# Patient Record
Sex: FEMALE | Race: WHITE | NOT HISPANIC OR LATINO | Employment: UNEMPLOYED | URBAN - METROPOLITAN AREA
[De-identification: names, ages, dates, MRNs, and addresses within clinical notes are randomized per-mention and may not be internally consistent; named-entity substitution may affect disease eponyms.]

---

## 2018-09-27 ENCOUNTER — HISTORICAL (OUTPATIENT)
Dept: ADMINISTRATIVE | Facility: HOSPITAL | Age: 38
End: 2018-09-27

## 2018-09-27 LAB
ALBUMIN SERPL-MCNC: 4.6 GM/DL (ref 3.4–5)
ALBUMIN/GLOB SERPL: 1.44 {RATIO} (ref 1.5–2.5)
ALP SERPL-CCNC: 58 UNIT/L (ref 38–126)
ALT SERPL-CCNC: 19 UNIT/L (ref 7–52)
AST SERPL-CCNC: 20 UNIT/L (ref 15–37)
BILIRUB SERPL-MCNC: 0.9 MG/DL (ref 0.2–1)
BILIRUBIN DIRECT+TOT PNL SERPL-MCNC: 0.2 MG/DL (ref 0–0.5)
BILIRUBIN DIRECT+TOT PNL SERPL-MCNC: 0.7 MG/DL
BUN SERPL-MCNC: 23 MG/DL (ref 7–18)
CALCIUM SERPL-MCNC: 9.6 MG/DL (ref 8.5–10)
CHLORIDE SERPL-SCNC: 102 MMOL/L (ref 98–107)
CO2 SERPL-SCNC: 29 MMOL/L (ref 21–32)
CREAT SERPL-MCNC: 0.74 MG/DL (ref 0.6–1.3)
GLOBULIN SER-MCNC: 3.2 GM/DL (ref 1.2–3)
GLUCOSE SERPL-MCNC: 96 MG/DL (ref 74–106)
POTASSIUM SERPL-SCNC: 3.9 MMOL/L (ref 3.5–5.1)
PROT SERPL-MCNC: 7.8 GM/DL (ref 6.4–8.2)
SODIUM SERPL-SCNC: 135 MMOL/L (ref 136–145)

## 2020-04-27 ENCOUNTER — HISTORICAL (OUTPATIENT)
Dept: ADMINISTRATIVE | Facility: HOSPITAL | Age: 40
End: 2020-04-27

## 2022-04-10 ENCOUNTER — HISTORICAL (OUTPATIENT)
Dept: ADMINISTRATIVE | Facility: HOSPITAL | Age: 42
End: 2022-04-10

## 2022-04-29 VITALS
DIASTOLIC BLOOD PRESSURE: 83 MMHG | BODY MASS INDEX: 35.16 KG/M2 | BODY MASS INDEX: 30.93 KG/M2 | HEIGHT: 65 IN | SYSTOLIC BLOOD PRESSURE: 114 MMHG | HEIGHT: 63 IN | OXYGEN SATURATION: 99 % | DIASTOLIC BLOOD PRESSURE: 96 MMHG | WEIGHT: 198.44 LBS | WEIGHT: 185.63 LBS | SYSTOLIC BLOOD PRESSURE: 138 MMHG | OXYGEN SATURATION: 97 %

## 2022-05-02 NOTE — HISTORICAL [facility]
This is a historical note converted from Des. Formatting and pictures may have been removed.  Please reference Des for original formatting and attached multimedia. Chief Complaint  DISCUSS GETTING BACK ON PAXIL FOR ANXIETY  History of Present Illness  Patient last seen 2016.? She previously was taking Paxil for generalized anxiety.? She ran out of her prescription 2 months ago.? Reports anxiety has significantly increased since that time.? Easily overwhelmed and increased irritability.  LFTs also noted to be elevated last year.? Patient was switched from Zoloft to Paxil due to this reason.? Patient also started?GI for further evaluation. ?Patient did not?proceed with this referral?has not had a follow-up since then. ?No obvious?etiology was found at that time.  One-month history of intermittent left eye redness, watery drainage?and eyelid edema.? Denies any blurred vision, photophobia?or purulent drainage.  Review of Systems  Constitutional:?No weight loss, no fever, no fatigue, no chills, no night sweats,?no weakness  Eyes:?No blurred vision,?redness,?no drainage,?no ocular?pain  HEENT:?No sore throat,?no ear pain, no sinus pressure, no nasal congestion, no rhinorrhea, no postnasal drip  Respiratory:?No cough, no wheezing, no sputum production, no shortness of breath  Cardiovascular:?No chest pain, no palpitations, no dyspnea on exertion,?no orthopnea  Gastrointestinal:?No nausea, no vomiting, no abdominal pain, no diarrhea,?no constipation, no melena,?no hematochezia  Genitourinary:?No dysuria, no hematuria, no frequency, no urgency, no incontinence, no discharge  Musculoskeletal:?No myalgias, no arthralgias, no weakness, no joint effusion, no edema  Integumentary:?No rashes, no hives, no itching, no lesions, no jaundice  Neurologic:?No headaches, no numbness, no tingling, no weakness, no dizziness  Psychiatric:?anxiety, irritability, no depression,?no suicidal ideations, no?homicidal ideations,?no  delusions, no hallucinations  Endocrine:?No polyuria, no polydipsia, no polyphagia  Hematology:?No bruising, no lymphadenopathy,?no paleness  ?  Physical Exam  Vitals & Measurements  HR:?80(Peripheral)? BP:?120/90?  HT:?165.1?cm? HT:?165.1?cm? WT:?79.7?kg? WT:?79.7?kg? BMI:?29.24?  General:?Well developed, Well-nourished, in No acute distress, A&O x 4  Eye:?PERRLA, EOMI, left-sided conjunctiva?erythema, left eyelid edematous  Ears:?Bilateral EAC clear?and?Bilateral TM clear  Nose:? Mucosa normal, No rhinorrhea, No lesions  O/P:??No?erythema,?No tonsillar hypertrophy,?No tonsillar exudates,?No cobblestoning  Neck:?Soft, Nontender, No thyromegaly, Full range of motion, No cervical lymphadenopathy, No JVD  Cardiovascular:?Regular rate and rhythm, No murmurs, No gallops, No rubs  Respiratory:?Clear to auscultation bilaterally, No wheezes, No rhonchi, No?crackles  Abdomen:? Soft, Nontender, No hepatosplenomegaly, Normal and equal bowel sounds, No masses, No rebound, No guarding  Musculoskeletal:? No tenderness, FROM, No joint abnormality, No clubbing, No cyanosis,No?edema  Neurologic:? No motor or sensory deficits, Reflexes 2+ throughout, CN II-XII grossly intact  Integumentary:?No rashes or skin lesions  ?   Psychiatric:?Good insight,?appropriate thought process, normal affect,?appropriate?speech,  non-suicidal, cooperative, appropriate judgment  Assessment/Plan  1.?MONTSE - Generalized anxiety disorder  ?Discuss treatment options at length?along with coping skills and stress management  Start citalopram 20 mg daily  Ordered:  Clinic Follow up, *Est. 12/27/18 3:00:00 CST, Order for future visit, MONTSE - Generalized anxiety disorder  Need for vaccination  Transaminitis  Redness of left eye, [facility]  Office/Outpatient Visit Level 4 Established [id number] PC, MONTSE - Generalized anxiety disorder  Need for vaccination  Transaminitis  Redness of left eye, [facility] - [facility], 09/27/18 10:58:00 CDT  ?  2.?Need for  vaccination  ?Influenza vaccine IM today  Ordered:  Clinic Follow up, *Est. 12/27/18 3:00:00 CST, Order for future visit, MONTSE - Generalized anxiety disorder  Need for vaccination  Transaminitis  Redness of left eye, [facility]  Office/Outpatient Visit Level 4 Established [id number] PC, MONTSE - Generalized anxiety disorder  Need for vaccination  Transaminitis  Redness of left eye, [facility] - [facility], 09/27/18 10:58:00 CDT  ?  3.?Transaminitis  ?CMP today  Ordered:  Clinic Follow up, *Est. 12/27/18 3:00:00 CST, Order for future visit, MONTSE - Generalized anxiety disorder  Need for vaccination  Transaminitis  Redness of left eye, [facility]  Office/Outpatient Visit Level 4 Established [id number] PC, MONTSE - Generalized anxiety disorder  Need for vaccination  Transaminitis  Redness of left eye, [facility] - [facility], 09/27/18 10:58:00 CDT  ?  4.?Redness of left eye  ?Recommend ophthalmology follow-up  Ordered:  Clinic Follow up, *Est. 12/27/18 3:00:00 CST, Order for future visit, MONTSE - Generalized anxiety disorder  Need for vaccination  Transaminitis  Redness of left eye, [facility]  Office/Outpatient Visit Level 4 Established [id number] PC, MONTSE - Generalized anxiety disorder  Need for vaccination  Transaminitis  Redness of left eye, [facility] - [facility], 09/27/18 10:58:00 CDT  ?  Orders:  citalopram, 20 mg = 1 tab(s), Oral, Daily, # 30 tab(s), 5 Refill(s), Pharmacy: [facility]/pharmacy #[id number]   Problem List/Past Medical History  Ongoing  MONTSE - Generalized anxiety disorder  Historical  Pregnant  Pregnant  Pregnant  Procedure/Surgical History  Delivery of Products of Conception, External Approach (07/26/2016)  Introduction of Other Hormone into Peripheral Vein, Percutaneous Approach (07/26/2016)  Appendectomy  wisdom teeth extraction   Medications  Celexa 20 mg oral tablet, 20 mg= 1 tab(s), Oral, Daily, 5 refills  Allergies  Penicillin G Potassium  Social History  Alcohol  Never, 07/25/2016  Substance Abuse  Never, 07/25/2016  Tobacco  Never  smoker, 07/25/2016  Family History  Depression.: Mother and Father.  Hyperglycemia.: Father.  Hypertension.: Mother and Father.  Hypothyroidism.: Mother.  Immunizations  Vaccine Date Status   influenza virus vaccine, inactivated 09/27/2018 Given   tetanus/diphtheria/pertussis, acel(Tdap) 07/26/2016 Given   Health Maintenance  Health Maintenance  ???Pending?(in the next year)  ??? ??Due?  ??? ? ? ?ADL Screening due??09/27/18??and every 1??year(s)  ??? ? ? ?Alcohol Misuse Screening due??09/27/18??and every 1??year(s)  ???Satisfied?(in the past 1 year)  ??? ??Satisfied?  ??? ? ? ?Blood Pressure Screening on??09/27/18.??Satisfied by Carolyn Lim  ??? ? ? ?Body Mass Index Check on??09/27/18.??Satisfied by Carolyn Lim  ??? ? ? ?Influenza Vaccine on??09/27/18.??Satisfied by Eleno Houston Jr, MD  ??? ? ? ?Obesity Screening on??09/27/18.??Satisfied by Carolyn Lim  ?  ?

## 2022-05-02 NOTE — HISTORICAL [facility]
This is a historical note converted from [place]. Formatting and pictures may have been removed.  Please reference [place] for original formatting and attached multimedia. Chief Complaint  left arm pain (bicep) unable to  arm over head, radiates down arm sometimes x 3 days  History of Present Illness  39-year-old female presents to clinic complaining of?left arm pain.? Patient states she woke up with the pain Saturday morning, 3 days ago. ?Denies any trauma injury or fall.? States that there is a dull ache constantly but?when trying to lift the arm or move the arm?forward?or to the side?or above?90 degrees to, for example,?brush her hair?the pain worsens and it will be a sharp pain. ?About 6 out of 10 in severity.? Patient denies any chest pain shortness of breath nausea?diaphoresis?abdominal pain.? Patient states the pain will rarely?radiate down the arm.? Patient denies carrying any heavy bags on that arm  Review of Systems  Constitutional: negative except as stated in HPI  Eye: negative except as stated in HPI  ENT: negative except as stated in HPI  Respiratory: negative except as stated in HPI  Cardiovascular: negative except as stated in HPI  Gastrointestinal: negative except as stated in HPI  Genitourinary: negative except as stated in HPI  Hema/Lymph: negative except as stated in HPI  Endocrine: negative except as stated in HPI  Immunologic: negative except as stated in HPI  Musculoskeletal: negative except as stated in HPI  Integumentary: negative except as stated in HPI  Neurologic: negative except as stated in HPI  All Other ROS_ negative except as stated in HPI  Physical Exam  Vitals & Measurements  T:?37.0? ?C (Oral)? HR:?89(Peripheral)? RR:?20? BP:?114/83? SpO2:?97%?  HT:?165?cm? WT:?84.2?kg? BMI:?30.93?  General_well-developed well-nourished in no acute distress  Musculoskeletal_tenderness to palpation left upper arm medial aspect near the axilla?there is no overlying erythema or warmth?of the  skin.? Patient has limited range of motion due to pain with extension and abduction.??There is no swelling of the arm  Integumentary_no rashes or skin lesions present  Neurologic_ cranial nerves intact, no signs of peripheral neurological deficit, motor/sensory function intact  ?  Assessment/Plan  1.?Left upper arm pain?M79.622  ?Start the anti-inflammatory today.? Do not take any Advil Aleve Motrin or ibuprofen with it.? Take the medication with food.? Pain medication may make you drowsy do not drink any alcohol operate heavy machinery?or?drive if you take it.? Apply ice to the affected area 3-4 times a day for 10 to 15 minutes.? call your family doctor tomorrow?to discuss.? If your pain continues after 1 week?I would recommend further evaluation with an orthopedic doctor. ?We can refer you to one if needed just call us.? If you have?any swelling of the arm?or worsening of symptoms?seek medical attention immediately  Ordered:  diclofenac, 75 mg = 1 tab(s), Oral, BID, # 14 tab(s), 0 Refill(s), Pharmacy: [facility] #[id number], 165, cm, Height/Length Dosing, 04/27/20 18:03:00 CDT, 84.2, kg, Weight Dosing, 04/27/20 18:03:00 CDT  traMADol, 50 mg = 1 tab(s), Oral, q6hr, PRN PRN as needed for pain, X 3 day(s), # 12 tab(s), 0 Refill(s), Pharmacy: [facility] #[id number], 165, cm, Height/Length Dosing, 04/27/20 18:03:00 CDT, 84.2, kg, Weight Dosing, 04/27/20 18:03:00 CDT  Electrocardiogram, Complete [id number] PC, 04/27/20 19:01:00 CDT, [facility], Routine, 04/27/20 19:01:00 CDT, Left upper arm pain  ?   Problem List/Past Medical History  Ongoing  MONTSE - Generalized anxiety disorder  Obesity  Historical  Pregnant  Pregnant  Pregnant  Procedure/Surgical History  Delivery of Products of Conception, External Approach (07/26/2016)  Introduction of Other Hormone into Peripheral Vein, Percutaneous Approach (07/26/2016)  Appendectomy  wisdom teeth extraction   Medications  diclofenac sodium 75 mg oral delayed release tablet, 75 mg= 1 tab(s),  Oral, BID  escitalopram 10 mg oral tablet, 10 mg= 1 tab(s), Oral, Daily  escitalopram 20 mg oral tablet, See Instructions  traMADol 50 mg oral tablet, 50 mg= 1 tab(s), Oral, q6hr, PRN  Allergies  Penicillin G Potassium  Social History  Abuse/Neglect  No, 04/27/2020  Alcohol  Never, 07/25/2016  Substance Use  Never, 07/25/2016  Tobacco  Never (less than 100 in lifetime), N/A, 01/24/2019  Family History  Depression.: Mother and Father.  Hyperglycemia.: Father.  Hypertension.: Mother and Father.  Hypothyroidism.: Mother.  Immunizations  Vaccine Date Status   influenza virus vaccine, inactivated 09/27/2018 Given   tetanus/diphtheria/pertussis, acel(Tdap) 07/26/2016 Given   Health Maintenance  Health Maintenance  ???Pending?(in the next year)  ??? ??OverDue  ??? ? ? ?Cervical Cancer Screening due??12/06/19??and every 3??year(s)  ??? ? ? ?Alcohol Misuse Screening due??01/01/20??and every 1??year(s)  ??? ??Due?  ??? ? ? ?ADL Screening due??04/27/20??and every 1??year(s)  ??? ? ? ?Depression Screening due??04/27/20??and every?  ??? ??Due In Future?  ??? ? ? ?Obesity Screening not due until??01/01/21??and every 1??year(s)  ???Satisfied?(in the past 1 year)  ??? ??Satisfied?  ??? ? ? ?Blood Pressure Screening on??04/27/20.??Satisfied by Erich Romo  ??? ? ? ?Body Mass Index Check on??04/27/20.??Satisfied by Erich Romo  ??? ? ? ?Obesity Screening on??04/27/20.??Satisfied by Erich Romo  ?  Diagnostic Results  Normal sinus rhythm no acute ST changes

## 2022-06-02 ENCOUNTER — OFFICE VISIT (OUTPATIENT)
Dept: URGENT CARE | Facility: CLINIC | Age: 42
End: 2022-06-02
Payer: COMMERCIAL

## 2022-06-02 VITALS
SYSTOLIC BLOOD PRESSURE: 123 MMHG | WEIGHT: 210 LBS | HEART RATE: 87 BPM | OXYGEN SATURATION: 100 % | DIASTOLIC BLOOD PRESSURE: 90 MMHG | RESPIRATION RATE: 16 BRPM | TEMPERATURE: 98 F | HEIGHT: 66 IN | BODY MASS INDEX: 33.75 KG/M2

## 2022-06-02 DIAGNOSIS — J06.9 ACUTE URI: Primary | ICD-10-CM

## 2022-06-02 PROCEDURE — 99213 PR OFFICE/OUTPT VISIT, EST, LEVL III, 20-29 MIN: ICD-10-PCS | Mod: 25,,, | Performed by: PHYSICIAN ASSISTANT

## 2022-06-02 PROCEDURE — 1159F PR MEDICATION LIST DOCUMENTED IN MEDICAL RECORD: ICD-10-PCS | Mod: CPTII,,, | Performed by: PHYSICIAN ASSISTANT

## 2022-06-02 PROCEDURE — 3008F PR BODY MASS INDEX (BMI) DOCUMENTED: ICD-10-PCS | Mod: CPTII,,, | Performed by: PHYSICIAN ASSISTANT

## 2022-06-02 PROCEDURE — 1160F PR REVIEW ALL MEDS BY PRESCRIBER/CLIN PHARMACIST DOCUMENTED: ICD-10-PCS | Mod: CPTII,,, | Performed by: PHYSICIAN ASSISTANT

## 2022-06-02 PROCEDURE — 96372 THER/PROPH/DIAG INJ SC/IM: CPT | Mod: ,,, | Performed by: PHYSICIAN ASSISTANT

## 2022-06-02 PROCEDURE — 99213 OFFICE O/P EST LOW 20 MIN: CPT | Mod: 25,,, | Performed by: PHYSICIAN ASSISTANT

## 2022-06-02 PROCEDURE — 1159F MED LIST DOCD IN RCRD: CPT | Mod: CPTII,,, | Performed by: PHYSICIAN ASSISTANT

## 2022-06-02 PROCEDURE — 96372 PR INJECTION,THERAP/PROPH/DIAG2ST, IM OR SUBCUT: ICD-10-PCS | Mod: ,,, | Performed by: PHYSICIAN ASSISTANT

## 2022-06-02 PROCEDURE — 3080F PR MOST RECENT DIASTOLIC BLOOD PRESSURE >= 90 MM HG: ICD-10-PCS | Mod: CPTII,,, | Performed by: PHYSICIAN ASSISTANT

## 2022-06-02 PROCEDURE — 3074F SYST BP LT 130 MM HG: CPT | Mod: CPTII,,, | Performed by: PHYSICIAN ASSISTANT

## 2022-06-02 PROCEDURE — 1160F RVW MEDS BY RX/DR IN RCRD: CPT | Mod: CPTII,,, | Performed by: PHYSICIAN ASSISTANT

## 2022-06-02 PROCEDURE — 3080F DIAST BP >= 90 MM HG: CPT | Mod: CPTII,,, | Performed by: PHYSICIAN ASSISTANT

## 2022-06-02 PROCEDURE — 3008F BODY MASS INDEX DOCD: CPT | Mod: CPTII,,, | Performed by: PHYSICIAN ASSISTANT

## 2022-06-02 PROCEDURE — 3074F PR MOST RECENT SYSTOLIC BLOOD PRESSURE < 130 MM HG: ICD-10-PCS | Mod: CPTII,,, | Performed by: PHYSICIAN ASSISTANT

## 2022-06-02 RX ORDER — AZITHROMYCIN 250 MG/1
TABLET, FILM COATED ORAL
Qty: 6 TABLET | Refills: 0 | Status: SHIPPED | OUTPATIENT
Start: 2022-06-02 | End: 2022-08-30

## 2022-06-02 RX ORDER — BUPROPION HCL 300 MG
300 TABLET, EXTENDED RELEASE 24 HR ORAL EVERY MORNING
COMMUNITY
Start: 2022-05-17 | End: 2023-06-19 | Stop reason: SDUPTHER

## 2022-06-02 RX ORDER — ESCITALOPRAM OXALATE 10 MG/1
TABLET, FILM COATED ORAL
COMMUNITY
Start: 2022-05-17 | End: 2022-10-12

## 2022-06-02 RX ORDER — BETAMETHASONE SODIUM PHOSPHATE AND BETAMETHASONE ACETATE 3; 3 MG/ML; MG/ML
9 INJECTION, SUSPENSION INTRA-ARTICULAR; INTRALESIONAL; INTRAMUSCULAR; SOFT TISSUE
Status: COMPLETED | OUTPATIENT
Start: 2022-06-02 | End: 2022-06-02

## 2022-06-02 RX ORDER — PROMETHAZINE HYDROCHLORIDE AND DEXTROMETHORPHAN HYDROBROMIDE 6.25; 15 MG/5ML; MG/5ML
5 SYRUP ORAL EVERY 6 HOURS PRN
Qty: 118 ML | Refills: 0 | Status: SHIPPED | OUTPATIENT
Start: 2022-06-02 | End: 2022-06-12

## 2022-06-02 RX ADMIN — BETAMETHASONE SODIUM PHOSPHATE AND BETAMETHASONE ACETATE 9 MG: 3; 3 INJECTION, SUSPENSION INTRA-ARTICULAR; INTRALESIONAL; INTRAMUSCULAR; SOFT TISSUE at 07:06

## 2022-06-02 NOTE — PROGRESS NOTES
"Subjective:       Patient ID: Kerline Londono is a 41 y.o. female.    Vitals:  height is 5' 6" (1.676 m) and weight is 95.3 kg (210 lb). Her temperature is 98 °F (36.7 °C). Her blood pressure is 123/90 (abnormal) and her pulse is 87. Her respiration is 16 and oxygen saturation is 100%.     Chief Complaint: Sinus Problem    Patient is a 41-year-old female complains of 4-5 day history of nasal congestion sinus pressure postnasal drip hoarseness and a cough.  Denies any fever neck stiffness rash shortness of breath GI symptoms.    Sinus Problem  This is a new problem. The current episode started today. The problem is unchanged. There has been no fever. Her pain is at a severity of 6/10. Associated symptoms include congestion, coughing, ear pain, a hoarse voice and a sore throat. Past treatments include oral decongestants.       HENT: Positive for ear pain, congestion and sore throat.    Respiratory: Positive for cough.        Objective:      Physical Exam   Constitutional: She is oriented to person, place, and time. She appears well-developed. She is cooperative.  Non-toxic appearance. She does not appear ill. No distress.   HENT:   Head: Normocephalic and atraumatic.   Ears:   Right Ear: Hearing, tympanic membrane, external ear and ear canal normal.   Left Ear: Hearing, tympanic membrane, external ear and ear canal normal.   Nose: Nose normal. No mucosal edema, rhinorrhea or nasal deformity. No epistaxis. Right sinus exhibits no maxillary sinus tenderness and no frontal sinus tenderness. Left sinus exhibits no maxillary sinus tenderness and no frontal sinus tenderness.   Mouth/Throat: Uvula is midline, oropharynx is clear and moist and mucous membranes are normal. No trismus in the jaw. Normal dentition. No uvula swelling. No oropharyngeal exudate, posterior oropharyngeal edema or posterior oropharyngeal erythema.   Eyes: Conjunctivae and lids are normal. No scleral icterus.   Neck: Trachea normal and phonation normal. " "Neck supple. No edema present. No erythema present. No neck rigidity present.   Cardiovascular: Normal rate, regular rhythm, normal heart sounds and normal pulses.   Pulmonary/Chest: Effort normal and breath sounds normal. No respiratory distress. She has no decreased breath sounds. She has no rhonchi.   Abdominal: Normal appearance.   Musculoskeletal: Normal range of motion.         General: No deformity. Normal range of motion.   Neurological: She is alert and oriented to person, place, and time. She exhibits normal muscle tone. Coordination normal.   Skin: Skin is warm, dry, intact, not diaphoretic and not pale.   Psychiatric: Her speech is normal and behavior is normal. Judgment and thought content normal.   Nursing note and vitals reviewed.  pt declines covid testing         Previous History      Review of patient's allergies indicates:   Allergen Reactions    Penicillins        Past Medical History:   Diagnosis Date    Anxiety      Current Outpatient Medications   Medication Instructions    azithromycin (ZITHROMAX Z-MILE) 250 MG tablet Take 2 tablets (500 mg) on  Day 1,  followed by 1 tablet (250 mg) once daily on Days 2 through 5.    LEXAPRO 10 mg tablet Oral    promethazine-dextromethorphan (PROMETHAZINE-DM) 6.25-15 mg/5 mL Syrp 5 mLs, Oral, Every 6 hours PRN    WELLBUTRIN  mg, Oral, Every morning     Past Surgical History:   Procedure Laterality Date    APPENDECTOMY       Family History   Problem Relation Age of Onset    No Known Problems Mother     No Known Problems Father        Social History     Tobacco Use    Smoking status: Never Smoker    Smokeless tobacco: Never Used   Substance Use Topics    Alcohol use: Not Currently    Drug use: Never        Physical Exam      Vital Signs Reviewed   BP (!) 123/90   Pulse 87   Temp 98 °F (36.7 °C)   Resp 16   Ht 5' 6" (1.676 m)   Wt 95.3 kg (210 lb)   LMP 05/26/2022   SpO2 100%   BMI 33.89 kg/m²        Procedures    Procedures     Labs "     Results for orders placed or performed in visit on 09/27/18   Comprehensive Metabolic Panel   Result Value Ref Range    Alanine Aminotransferase 19.0 7.0 - 52.0 unit/L    Albumin/Globulin Ratio 1.44 (L) 1.50 - 2.50    Alkaline Phosphatase 58.0 38.0 - 126.0 unit/L    Albumin Level 4.6 3.4 - 5.0 gm/dL    Aspartate Aminotransferase 20.0 15.0 - 37.0 unit/L    Bilirubin Total 0.9 0.2 - 1.0 mg/dL    Bilirubin Indirect 0.7 mg/dL    Blood Urea Nitrogen 23.0 (H) 7.0 - 18.0 mg/dL    Bilirubin Direct 0.2 0.0 - 0.5 mg/dL    Chloride 102.0 98.0 - 107.0 mmol/L    Carbon Dioxide 29.0 21.0 - 32.0 mmol/L    Calcium Level Total 9.6 8.5 - 10.0 mg/dL    Creatinine 0.74 0.60 - 1.30 mg/dL    Globulin 3.2 (H) 1.2 - 3.0 gm/dL    Glucose Level 96.0 74.0 - 106.0 mg/dL    Sodium Level 135.0 (L) 136.0 - 145.0 mmol/L    Potassium Level 3.9 3.5 - 5.1 mmol/L    Protein Total 7.8 6.4 - 8.2 gm/dL   GFR, Estimated   Result Value Ref Range    Estimated GFR-Non African American >60 mL/min/1.73 m2    Estimated GFR-African American >60 mL/min/1.73 m2         Assessment:       1. Acute URI          Plan:         Acute URI  -     betamethasone acetate-betamethasone sodium phosphate injection 9 mg  -     azithromycin (ZITHROMAX Z-MILE) 250 MG tablet; Take 2 tablets (500 mg) on  Day 1,  followed by 1 tablet (250 mg) once daily on Days 2 through 5.  Dispense: 6 tablet; Refill: 0  -     promethazine-dextromethorphan (PROMETHAZINE-DM) 6.25-15 mg/5 mL Syrp; Take 5 mLs by mouth every 6 (six) hours as needed.  Dispense: 118 mL; Refill: 0    Drink plenty of fluids    Get plenty of rest.    Follow-up with your primary care doctor      Go to emergency department with any significant change or worsening symptoms.    Tylenol or Motrin as needed for fever.

## 2022-06-30 ENCOUNTER — OFFICE VISIT (OUTPATIENT)
Dept: URGENT CARE | Facility: CLINIC | Age: 42
End: 2022-06-30
Payer: COMMERCIAL

## 2022-06-30 VITALS
DIASTOLIC BLOOD PRESSURE: 78 MMHG | SYSTOLIC BLOOD PRESSURE: 119 MMHG | OXYGEN SATURATION: 96 % | HEART RATE: 110 BPM | TEMPERATURE: 99 F | WEIGHT: 210 LBS | HEIGHT: 66 IN | BODY MASS INDEX: 33.75 KG/M2 | RESPIRATION RATE: 18 BRPM

## 2022-06-30 DIAGNOSIS — R11.0 NAUSEA: Primary | ICD-10-CM

## 2022-06-30 DIAGNOSIS — U07.1 COVID: ICD-10-CM

## 2022-06-30 LAB
CTP QC/QA: YES
SARS-COV-2 RDRP RESP QL NAA+PROBE: POSITIVE

## 2022-06-30 PROCEDURE — 1160F RVW MEDS BY RX/DR IN RCRD: CPT | Mod: CPTII,,, | Performed by: FAMILY MEDICINE

## 2022-06-30 PROCEDURE — 1159F PR MEDICATION LIST DOCUMENTED IN MEDICAL RECORD: ICD-10-PCS | Mod: CPTII,,, | Performed by: FAMILY MEDICINE

## 2022-06-30 PROCEDURE — U0002: ICD-10-PCS | Mod: QW,,, | Performed by: FAMILY MEDICINE

## 2022-06-30 PROCEDURE — U0002 COVID-19 LAB TEST NON-CDC: HCPCS | Mod: QW,,, | Performed by: FAMILY MEDICINE

## 2022-06-30 PROCEDURE — 3008F PR BODY MASS INDEX (BMI) DOCUMENTED: ICD-10-PCS | Mod: CPTII,,, | Performed by: FAMILY MEDICINE

## 2022-06-30 PROCEDURE — 99213 OFFICE O/P EST LOW 20 MIN: CPT | Mod: ,,, | Performed by: FAMILY MEDICINE

## 2022-06-30 PROCEDURE — 1159F MED LIST DOCD IN RCRD: CPT | Mod: CPTII,,, | Performed by: FAMILY MEDICINE

## 2022-06-30 PROCEDURE — 99213 PR OFFICE/OUTPT VISIT, EST, LEVL III, 20-29 MIN: ICD-10-PCS | Mod: ,,, | Performed by: FAMILY MEDICINE

## 2022-06-30 PROCEDURE — 3078F PR MOST RECENT DIASTOLIC BLOOD PRESSURE < 80 MM HG: ICD-10-PCS | Mod: CPTII,,, | Performed by: FAMILY MEDICINE

## 2022-06-30 PROCEDURE — 3074F SYST BP LT 130 MM HG: CPT | Mod: CPTII,,, | Performed by: FAMILY MEDICINE

## 2022-06-30 PROCEDURE — 3008F BODY MASS INDEX DOCD: CPT | Mod: CPTII,,, | Performed by: FAMILY MEDICINE

## 2022-06-30 PROCEDURE — 3074F PR MOST RECENT SYSTOLIC BLOOD PRESSURE < 130 MM HG: ICD-10-PCS | Mod: CPTII,,, | Performed by: FAMILY MEDICINE

## 2022-06-30 PROCEDURE — 3078F DIAST BP <80 MM HG: CPT | Mod: CPTII,,, | Performed by: FAMILY MEDICINE

## 2022-06-30 PROCEDURE — 1160F PR REVIEW ALL MEDS BY PRESCRIBER/CLIN PHARMACIST DOCUMENTED: ICD-10-PCS | Mod: CPTII,,, | Performed by: FAMILY MEDICINE

## 2022-06-30 RX ORDER — PROMETHAZINE HYDROCHLORIDE 25 MG/1
25 TABLET ORAL EVERY 6 HOURS PRN
Qty: 16 TABLET | Refills: 0 | Status: SHIPPED | OUTPATIENT
Start: 2022-06-30 | End: 2022-07-04

## 2022-06-30 NOTE — PROGRESS NOTES
"Subjective:       Patient ID: Kerline Londono is a 41 y.o. female.    Vitals:  height is 5' 6" (1.676 m) and weight is 95.3 kg (210 lb). Her temperature is 99.4 °F (37.4 °C). Her blood pressure is 119/78 and her pulse is 110. Her respiration is 18 and oxygen saturation is 96%.     Chief Complaint: Headache, Ear Fullness, and Nausea    5-year-old female presents to clinic complaining of HA, nausea, Ear Fullness, fever.  Denies any vomiting diarrhea or shortness of breath.  Patient is COVID vaccinated.  Symptoms  started yesterday.     Headache   Associated symptoms include nausea.   Ear Fullness   Associated symptoms include headaches.   Nausea  Associated symptoms include headaches and nausea.       Constitution: Negative.   HENT: Negative.    Cardiovascular: Negative.    Eyes: Negative.    Respiratory: Negative.    Gastrointestinal: Positive for nausea.   Genitourinary: Negative.    Musculoskeletal: Negative.    Skin: Negative.    Allergic/Immunologic: Negative.    Neurological: Positive for headaches.   Hematologic/Lymphatic: Negative.        Objective:      Physical Exam   Constitutional: She is oriented to person, place, and time.   HENT:   Head: Normocephalic and atraumatic.   Ears:   Right Ear: External ear normal.   Left Ear: External ear normal.   Mouth/Throat: No posterior oropharyngeal erythema.   Eyes: Conjunctivae are normal.   Pulmonary/Chest: Effort normal and breath sounds normal. No respiratory distress. She has no wheezes. She has no rhonchi. She has no rales.   Abdominal: Normal appearance.   Neurological: She is alert and oriented to person, place, and time.   Psychiatric: Her behavior is normal. Mood, judgment and thought content normal.   Vitals reviewed.          Previous History      Review of patient's allergies indicates:   Allergen Reactions    Penicillins        Past Medical History:   Diagnosis Date    Anxiety      Current Outpatient Medications   Medication Instructions    azithromycin " "(ZITHROMAX Z-MILE) 250 MG tablet Take 2 tablets (500 mg) on  Day 1,  followed by 1 tablet (250 mg) once daily on Days 2 through 5.    LEXAPRO 10 mg tablet Oral    nirmatrelvir-ritonavir 150 mg x 2- 100 mg copackaged tablets (EUA) Take 3 tablets by mouth 2 (two) times daily. Each dose contains 2 nirmatrelvir (pink tablets) and 1 ritonavir (white tablet). Take all 3 tablets together    promethazine (PHENERGAN) 25 mg, Oral, Every 6 hours PRN    WELLBUTRIN  mg, Oral, Every morning     Past Surgical History:   Procedure Laterality Date    APPENDECTOMY       Family History   Problem Relation Age of Onset    No Known Problems Mother     No Known Problems Father        Social History     Tobacco Use    Smoking status: Never Smoker    Smokeless tobacco: Never Used   Substance Use Topics    Alcohol use: Not Currently    Drug use: Never        Physical Exam      Vital Signs Reviewed   /78   Pulse 110   Temp 99.4 °F (37.4 °C)   Resp 18   Ht 5' 6" (1.676 m)   Wt 95.3 kg (210 lb)   LMP 06/16/2022 (LMP Unknown)   SpO2 96%   BMI 33.89 kg/m²        Procedures    Procedures     Labs     Results for orders placed or performed in visit on 06/30/22   POCT COVID-19 Rapid Screening   Result Value Ref Range    POC Rapid COVID Positive (A) Negative     Acceptable Yes          Assessment:       1. Nausea    2. COVID          Plan:       Medications sent to pharmacy.  Nausea medication may cause drowsiness.  Do not drink alcohol or drive when taking it.  COVID Positive  Start vitamin C 1000mg twice a day, zinc 100mg once a day, and vitamin D3 at least 2000 units a day. Current CDC guidelines recommend that you quarantine for 5 days starting the day after your symptoms began. Quarantine can end after 5 days as long as the last 24 hours of quarantine you are fever free and there is improvement of all your symptoms. Wear a mask around others for 5 additional days after quarantine. Treat your symptoms " as you would the common cold. If you live with anybody, isolate yourself in a separate bedroom and use a separate bathroom. If your symptoms worsen or you develop shortness of breath or a fever over 102.5 , seek medical attention immediately.     Nausea  -     POCT COVID-19 Rapid Screening    COVID    Other orders  -     nirmatrelvir-ritonavir 150 mg x 2- 100 mg copackaged tablets (EUA); Take 3 tablets by mouth 2 (two) times daily. Each dose contains 2 nirmatrelvir (pink tablets) and 1 ritonavir (white tablet). Take all 3 tablets together  Dispense: 30 tablet; Refill: 0  -     promethazine (PHENERGAN) 25 MG tablet; Take 1 tablet (25 mg total) by mouth every 6 (six) hours as needed for Nausea.  Dispense: 16 tablet; Refill: 0

## 2022-06-30 NOTE — PATIENT INSTRUCTIONS
Medications sent to pharmacy.  Nausea medication may cause drowsiness.  Do not drink alcohol or drive when taking it.  COVID Positive  Start vitamin C 1000mg twice a day, zinc 100mg once a day, and vitamin D3 at least 2000 units a day. Current CDC guidelines recommend that you quarantine for 5 days starting the day after your symptoms began. Quarantine can end after 5 days as long as the last 24 hours of quarantine you are fever free and there is improvement of all your symptoms. Wear a mask around others for 5 additional days after quarantine. Treat your symptoms as you would the common cold. If you live with anybody, isolate yourself in a separate bedroom and use a separate bathroom. If your symptoms worsen or you develop shortness of breath or a fever over 102.5 , seek medical attention immediately.

## 2022-08-31 PROBLEM — E66.9 OBESITY: Status: ACTIVE | Noted: 2022-08-31

## 2022-08-31 PROBLEM — J06.9 ACUTE URI: Status: RESOLVED | Noted: 2022-06-02 | Resolved: 2022-08-31

## 2022-08-31 PROBLEM — F41.1 GENERALIZED ANXIETY DISORDER: Status: ACTIVE | Noted: 2022-08-31

## 2022-08-31 PROBLEM — F32.A DEPRESSION: Status: ACTIVE | Noted: 2022-08-31

## 2022-08-31 PROCEDURE — 82607 VITAMIN B-12: CPT | Performed by: FAMILY MEDICINE

## 2022-10-13 PROBLEM — E55.9 VITAMIN D DEFICIENCY: Status: ACTIVE | Noted: 2022-10-13

## 2022-10-13 PROBLEM — E03.8 SUBCLINICAL HYPOTHYROIDISM: Status: ACTIVE | Noted: 2022-10-13

## 2024-05-20 ENCOUNTER — ON-DEMAND VIRTUAL (OUTPATIENT)
Dept: URGENT CARE | Facility: CLINIC | Age: 44
End: 2024-05-20
Payer: COMMERCIAL

## 2024-05-20 DIAGNOSIS — J02.9 ACUTE PHARYNGITIS, UNSPECIFIED ETIOLOGY: Primary | ICD-10-CM

## 2024-05-20 DIAGNOSIS — J01.40 ACUTE NON-RECURRENT PANSINUSITIS: ICD-10-CM

## 2024-05-20 PROCEDURE — 99213 OFFICE O/P EST LOW 20 MIN: CPT | Mod: 95,S$GLB,, | Performed by: FAMILY MEDICINE

## 2024-05-20 RX ORDER — CEFDINIR 300 MG/1
300 CAPSULE ORAL 2 TIMES DAILY
Qty: 20 CAPSULE | Refills: 0 | Status: SHIPPED | OUTPATIENT
Start: 2024-05-20 | End: 2024-05-30

## 2024-05-20 RX ORDER — NAPROXEN 500 MG/1
500 TABLET ORAL 2 TIMES DAILY WITH MEALS
Qty: 20 TABLET | Refills: 0 | Status: SHIPPED | OUTPATIENT
Start: 2024-05-20

## 2024-05-20 NOTE — LETTER
May 20, 2024  Kerline Londono  [address]  [place] [address]                [facility] [place]  [address], [facility] [[email or web address]] A  [facility] [address]  Phone: [phone]  Fax: [phone] Kerline Londono was seen and treated by Telemedicine on 5/20/2024. She may return to work in 2 - 3 days.      If you have any questions or concerns, please don't hesitate to call.        Sincerely,        Rafael Connelly MD

## 2024-05-21 NOTE — PATIENT INSTRUCTIONS
Please drink plenty of fluids.  Please get plenty of rest.  Please return here or go to the Emergency Department for any concerns or worsening of condition.  If you were given wait & see antibiotics, please wait 3-5 days before taking them, and only take them if your symptoms have worsened or not improved.  If you do begin taking the antibiotics, please take them to completion.  If you were prescribed antibiotics, please take them to completion.  If you were prescribed a narcotic medication, do not drive or operate heavy equipment or machinery while taking these medications.    You were given a decongestant (RESCON or POLY VENT Dm).  If your insurance does not cover it or you cannot afford it, it is ok to use the over the counter products listed below.  If you do not have Hypertension or any history of palpitations, it is ok to take over the counter Sudafed or Mucinex D or Allegra-D or Claritin-D or Zyrtec-D.  If you do take one of the above, it is ok to combine that with plain over the counter Mucinex or Allegra or Claritin or Zyrtec.  If for example you are taking Zyrtec -D, you can combine that with Mucinex, but not Mucinex-D.  If you are taking Mucinex-D, you can combine that with plain Allegra or Claritin or Zyrtec.   If you do have Hypertension or palpitations, it is safe to take Coricidin HBP for relief of sinus symptoms.    We recommend you take over the counter Flonase (Fluticasone) or another nasally inhaled steroid unless you are already taking one.  Nasal irrigation with a saline spray or Netti Pot like device per their directions is also recommended.  If not allergic, please take over the counter Tylenol (Acetaminophen) and/or Motrin (Ibuprofen) as directed for control of pain and/or fever.    Robitussin DM 2 teas every 4 hours as needed for cough.  If you  smoke, please stop smoking.    Please follow up with your primary care doctor or specialist as needed.  Eleno Houston Jr.,  MD  [phone]    You must understand that you have received treatment at an Urgent Care facility only, and that you may be  released before all of your medical problems are known or treated. Urgent Care facilities are not equipped to  handle life threatening emergencies. It is recommended that you seek care at an Emergency Department for  further evaluation of worsening or concerning symptoms, or possibly life threatening conditions as  discussed.    Pharyngitis: Strep (Presumed)    You have pharyngitis (sore throat). The cause is thought to be the streptococcus, or strep, bacterium. Strep throat infection can cause throat pain that is worse when swallowing, aching all over, headache, and fever. The infection may be spread by coughing, kissing, or touching others after touching your mouth or nose. Antibiotic medications are given to treat the infection.  Home care  Rest at home. Drink plenty of fluids to avoid dehydration.  No work or school for the first 2 days of taking the antibiotics. After this time, you will not be contagious. You can then return to work or school if you are feeling better.   The antibiotic medication must be taken for the full 10 days, even if you feel better. This is very important to ensure the infection is treated. It is also important to prevent drug-resistant organisms from developing. If you were given an antibiotic shot, no more antibiotics are needed.  You may use acetaminophen or ibuprofen to control pain or fever, unless another medicine was prescribed for this. If you have chronic liver or kidney disease or ever had a stomach ulcer or GI bleeding, talk with your doctor before using these medicines.  Throat lozenges or a throat-numbing sprays can help reduce throat pain. Gargling with warm salt water can also help. Dissolve 1/2 teaspoon of salt in 1 8 ounce glass of warm water.   Avoid salty or spicy foods, which can irritate the throat.  Follow-up care  Follow up with your  healthcare provider or our staff if you are not improving over the next week.  When to seek medical advice  Call your healthcare provider right away if any of these occur:  Fever as directed by your doctor.   New or worsening ear pain, sinus pain, or headache  Painful lumps in the back of neck  Stiff neck  Lymph nodes are getting larger  Inability to swallow liquids, excessive drooling, or inability to open mouth wide due to throat pain  Signs of dehydration (very dark urine or no urine, sunken eyes, dizziness)  Trouble breathing or noisy breathing  Muffled voice  New rash  Date Last Reviewed: 4/13/2015  © 2751-1225 [employer]. [address] [place] [address]. All rights reserved. This information is not intended as a substitute for professional medical care. Always follow your healthcare professional's instructions.      Acute Bacterial Rhinosinusitis (ABRS)  Acute bacterial rhinosinusitis (ABRS) is an infection of your nasal cavity and sinuses. Its caused by bacteria. Acute means that youve had symptoms for less than 12 weeks.  Understanding your sinuses  The nasal cavity is the large air-filled space behind your nose. The sinuses are a group of spaces formed by the bones of your face. They connect with your nasal cavity. ABRS causes the tissue lining these spaces to become inflamed. Mucus may not drain normally. This leads to facial pain and other symptoms.  What causes ABRS?  ABRS most often follows an upper respiratory infection caused by a virus. Bacteria then infect the lining of your nasal cavity and sinuses. But you can also get ABRS if you have:  Nasal allergies  Long-term nasal swelling and congestion not caused by allergies  Blockage in the nose  Symptoms of ABRS  The symptoms of ABRS may be different for each person, and can include:  Nasal congestion  Runny nose  Fluid draining from the nose down the throat (postnasal drip)  Headache  Cough  Pain in the sinuses  Thick,  colored fluid from the nose (mucus)  Fever  Diagnosing ABRS  ABRS may be diagnosed if youve had an upper respiratory infection like a cold and cough for longer than 10 to 14 days. Your health care provider will ask about your symptoms and your medical history. The provider will check your vital signs, including your temperature. Youll have a physical exam. The health care provider will check your ears, nose, and throat. You likely wont need any tests. If ABRS comes back, you may have a culture or other tests.  Treatment for ABRS  Treatment may include:  Antibiotic medicine. This is for symptoms that last for at least 10 to 14 days.  Nasal corticosteroid medicine. Drops or spray used in the nose can lessen swelling and congestion.  Over-the-counter pain medicine. This is to lessen sinus pain and pressure.  Nasal decongestant medicine. Spray or drops may help to lessen congestion. Do not use them for more than a few days.  Salt wash (saline irrigation). This can help to loosen mucus.  Possible complications of ABRS  ABRS may come back or become long-term (chronic).  In rare cases, ABRS may cause complications such as:   Inflamed tissue around the brain and spinal cord (meningitis)  Inflamed tissue around the eyes (orbital cellulitis)  Inflamed bones around the sinuses (osteitis)  These problems may need to be treated in a hospital with intravenous (IV) antibiotic medicine or surgery.  When to call the health care provider  Call your health care provider if you have any of the following:  Symptoms that dont get better, or get worse  Symptoms that dont get better after 3 to 5 days on antibiotics  Trouble seeing  Swelling around your eyes  Confusion or trouble staying awake   Date Last Reviewed: 3/3/2015  © 0510-1232 [employer]. [address] [place] [address]. All rights reserved. This information is not intended as a substitute for professional medical care. Always follow your healthcare  professional's instructions.

## 2024-05-21 NOTE — PROGRESS NOTES
Subjective:      Patient ID: Kerline Londono is a 43 y.o. female.    Vitals:  vitals were not taken for this visit.     Chief Complaint: Sore Throat and Sinus Problem      Visit Type: TELE AUDIOVISUAL    Present with the patient at the time of consultation: TELEMED PRESENT WITH PATIENT: None    Past Medical History:   Diagnosis Date    Anxiety     Depression     Thyroid disease     Vitamin D deficiency      Past Surgical History:   Procedure Laterality Date    APPENDECTOMY      WISDOM TOOTH EXTRACTION       Review of patient's allergies indicates:   Allergen Reactions    Penicillin g potassium     Penicillins      Current Outpatient Medications on File Prior to Visit   Medication Sig Dispense Refill    buPROPion (WELLBUTRIN XL) 300 MG 24 hr tablet Take 1 tablet (300 mg total) by mouth every morning. 90 tablet 3    ergocalciferol (ERGOCALCIFEROL) 50,000 unit Cap Take 1 capsule (50,000 units total) by mouth every 7 days 12 capsule 0    EScitalopram oxalate (LEXAPRO) 20 MG tablet TAKE ONE TABLET BY MOUTH IN THE MORNING 90 tablet 1    levothyroxine (SYNTHROID) 75 MCG tablet TAKE 1 TABLET BY MOUTH BEFORE BREAKFAST 90 tablet 1    levothyroxine (SYNTHROID) 88 MCG tablet Take 1 tablet (88 mcg total) by mouth before breakfast. 30 tablet 5     No current facility-administered medications on file prior to visit.     Family History   Problem Relation Name Age of Onset    Arthritis Mother Priscila Tom     Depression Mother Priscila Tom     Hypertension Mother Priscila Tom     Depression Father Beto Satnos     Diabetes type II Father Beto Dumontreaux     Hypertension Father Beto Santos     Stroke Father Beto Santos     No Known Problems Brother      Stomach cancer Maternal Grandmother Odalie Viator     VISHAL disease Son      No Known Problems Son         Medications Ordered                [id number] [place] - [address]   [address] [address]    Telephone:  [phone]   Fax: [phone]   Hours: Not open 24 hours                         E-Prescribed (2 of 2)              cefdinir (OMNICEF) 300 MG capsule    Sig: Take 1 capsule (300 mg total) by mouth 2 (two) times daily. for 10 days       Start: 5/20/24     Quantity: 20 capsule Refills: 0                         naproxen (NAPROSYN) 500 MG tablet    Sig: Take 1 tablet (500 mg total) by mouth 2 (two) times daily with meals.       Start: 5/20/24     Quantity: 20 tablet Refills: 0                           Ohs Peq Odvv Intake    5/20/2024  7:19 PM CDT - Filed by Patient   What is your current physical address in the event of a medical emergency? [address] [place]   Are you able to take your vital signs? No   Please attach any relevant images or files          Sore throat    Sore Throat   This is a new problem. The current episode started 1 to 4 weeks ago. The problem has been gradually worsening. Neither side of throat is experiencing more pain than the other. There has been no fever. The pain is at a severity of 2/10. The pain is mild. Associated symptoms include congestion, coughing, a hoarse voice and swollen glands. She has tried cool liquids for the symptoms. The treatment provided mild relief.   Sinus Problem  This is a new problem. The current episode started 1 to 4 weeks ago. The problem has been gradually worsening since onset. Her pain is at a severity of 2/10. The pain is mild. Associated symptoms include chills, congestion, coughing, a hoarse voice, sinus pressure, a sore throat and swollen glands. Past treatments include oral decongestants. The treatment provided mild relief.       Constitution: Positive for chills.   HENT:  Positive for congestion, sinus pressure and sore throat.    Cardiovascular: Negative.    Eyes: Negative.    Respiratory:  Positive for cough.    Gastrointestinal: Negative.    Endocrine: negative.   Genitourinary: Negative.    Musculoskeletal: Negative.    Skin: Negative.     Allergic/Immunologic: Negative.    Neurological: Negative.    Hematologic/Lymphatic: Negative.    Psychiatric/Behavioral: Negative.          Objective:   The physical exam was conducted virtually.  Physical Exam   Constitutional: She is oriented to person, place, and time.   HENT:   Head: Normocephalic and atraumatic.   Eyes: Conjunctivae are normal.   Pulmonary/Chest: Effort normal. No respiratory distress.   Musculoskeletal: Normal range of motion.         General: Normal range of motion.   Neurological: no focal deficit. She is alert and oriented to person, place, and time.   Psychiatric: Her behavior is normal. Mood, judgment and thought content normal.   Vitals reviewed.      Assessment:     1. Acute pharyngitis, unspecified etiology    2. Acute non-recurrent pansinusitis        Plan:       Acute pharyngitis, unspecified etiology    Acute non-recurrent pansinusitis  -     cefdinir (OMNICEF) 300 MG capsule; Take 1 capsule (300 mg total) by mouth 2 (two) times daily. for 10 days  Dispense: 20 capsule; Refill: 0  -     naproxen (NAPROSYN) 500 MG tablet; Take 1 tablet (500 mg total) by mouth 2 (two) times daily with meals.  Dispense: 20 tablet; Refill: 0    Please drink plenty of fluids.  Please get plenty of rest.  Please return here or go to the Emergency Department for any concerns or worsening of condition.  You were prescribed Lohist every 6 hours for cough and congestion.  If your insurance does not cover this medication or you cannot afford it, you can use Children's Triaminic or Children's Delsym for cough and congestion symptoms.  If you were given wait & see antibiotics, please wait 3-5 days before taking them, and only take them if your symptoms have worsened or not improved.  If you do begin taking the antibiotics, please take them to completion.  If you were prescribed antibiotics, please take them to completion.  If not allergic, please take over the counter Tylenol (Acetaminophen) as directed for  control of pain and/or fever.  If you are over 6 months old and if not allergic, you may take over the counter Motrin (Ibuprofen) as directed for control of pain and/or fever    Please follow up with your primary care doctor or specialist as needed.    Eleno Houston Jr., MD  [phone]    You must understand that you have received treatment at an Urgent Care facility only, and that you may be  released before all of your medical problems are known or treated. Urgent Care facilities are not equipped to  handle life threatening emergencies. It is recommended that you seek care at an Emergency Department for  further evaluation of worsening or concerning symptoms, or possibly life threatening conditions as  discussed.

## 2024-11-22 PROBLEM — Z00.00 WELLNESS EXAMINATION: Status: ACTIVE | Noted: 2024-11-22

## 2024-11-25 PROCEDURE — 83540 ASSAY OF IRON: CPT | Performed by: NURSE PRACTITIONER
